# Patient Record
Sex: MALE | Race: BLACK OR AFRICAN AMERICAN | NOT HISPANIC OR LATINO | Employment: OTHER | URBAN - METROPOLITAN AREA
[De-identification: names, ages, dates, MRNs, and addresses within clinical notes are randomized per-mention and may not be internally consistent; named-entity substitution may affect disease eponyms.]

---

## 2023-09-10 PROBLEM — S82.402D CLOSED FRACTURE OF LEFT FIBULA WITH ROUTINE HEALING: Status: ACTIVE | Noted: 2023-09-10

## 2023-09-10 PROBLEM — E11.40 TYPE 2 DIABETES, CONTROLLED, WITH NEUROPATHY (MULTI): Status: ACTIVE | Noted: 2023-09-10

## 2023-09-10 PROBLEM — R26.89 ANTALGIC GAIT: Status: ACTIVE | Noted: 2023-09-10

## 2023-09-10 PROBLEM — M54.12 CERVICAL RADICULOPATHY: Status: ACTIVE | Noted: 2023-09-10

## 2023-09-10 PROBLEM — S82.832A CLOSED FRACTURE OF DISTAL END OF LEFT FIBULA, INITIAL ENCOUNTER: Status: ACTIVE | Noted: 2023-09-10

## 2023-09-10 RX ORDER — AZELASTINE 1 MG/ML
SPRAY, METERED NASAL
COMMUNITY

## 2023-09-10 RX ORDER — HYDROXYZINE HYDROCHLORIDE 25 MG/1
TABLET, FILM COATED ORAL
COMMUNITY

## 2023-09-10 RX ORDER — METOPROLOL SUCCINATE 50 MG/1
TABLET, EXTENDED RELEASE ORAL
COMMUNITY

## 2023-09-10 RX ORDER — ACYCLOVIR 400 MG/1
TABLET ORAL
COMMUNITY

## 2023-09-10 RX ORDER — INSULIN GLARGINE 100 [IU]/ML
INJECTION, SOLUTION SUBCUTANEOUS
COMMUNITY

## 2023-09-10 RX ORDER — AZITHROMYCIN 250 MG/1
TABLET, FILM COATED ORAL
COMMUNITY

## 2023-09-10 RX ORDER — POLYETHYLENE GLYCOL 3350, SODIUM SULFATE ANHYDROUS, SODIUM BICARBONATE, SODIUM CHLORIDE, POTASSIUM CHLORIDE 236; 22.74; 6.74; 5.86; 2.97 G/4L; G/4L; G/4L; G/4L; G/4L
POWDER, FOR SOLUTION ORAL
COMMUNITY

## 2023-09-10 RX ORDER — BLOOD SUGAR DIAGNOSTIC
STRIP MISCELLANEOUS
COMMUNITY

## 2023-09-10 RX ORDER — SPIRONOLACTONE 25 MG/1
TABLET ORAL
COMMUNITY

## 2023-09-10 RX ORDER — TAMSULOSIN HYDROCHLORIDE 0.4 MG/1
CAPSULE ORAL
COMMUNITY

## 2023-09-10 RX ORDER — DULAGLUTIDE 4.5 MG/.5ML
INJECTION, SOLUTION SUBCUTANEOUS
COMMUNITY

## 2023-09-10 RX ORDER — AMMONIUM LACTATE 12 G/100G
CREAM TOPICAL
COMMUNITY

## 2023-09-10 RX ORDER — IBUPROFEN 600 MG/1
TABLET ORAL
COMMUNITY

## 2023-09-10 RX ORDER — OMEPRAZOLE 40 MG/1
CAPSULE, DELAYED RELEASE ORAL
COMMUNITY

## 2023-09-10 RX ORDER — ACETAMINOPHEN AND CODEINE PHOSPHATE 300; 30 MG/1; MG/1
TABLET ORAL
COMMUNITY

## 2023-09-10 RX ORDER — ATORVASTATIN CALCIUM 80 MG/1
TABLET, FILM COATED ORAL
COMMUNITY

## 2023-09-10 RX ORDER — CEPHALEXIN 500 MG/1
CAPSULE ORAL
COMMUNITY

## 2023-09-10 RX ORDER — FLUTICASONE PROPIONATE 50 MCG
SPRAY, SUSPENSION (ML) NASAL
COMMUNITY

## 2023-09-10 RX ORDER — LISINOPRIL 20 MG/1
TABLET ORAL
COMMUNITY

## 2023-09-10 RX ORDER — METFORMIN HYDROCHLORIDE EXTENDED-RELEASE TABLETS 500 MG/1
TABLET, FILM COATED, EXTENDED RELEASE ORAL
COMMUNITY

## 2023-09-10 RX ORDER — NITROGLYCERIN 0.3 MG/1
TABLET SUBLINGUAL
COMMUNITY

## 2023-10-13 ENCOUNTER — APPOINTMENT (OUTPATIENT)
Dept: ORTHOPEDIC SURGERY | Facility: CLINIC | Age: 68
End: 2023-10-13
Payer: COMMERCIAL

## 2023-10-16 ENCOUNTER — OFFICE VISIT (OUTPATIENT)
Dept: ORTHOPEDIC SURGERY | Facility: HOSPITAL | Age: 68
End: 2023-10-16
Payer: COMMERCIAL

## 2023-10-16 ENCOUNTER — HOSPITAL ENCOUNTER (OUTPATIENT)
Dept: RADIOLOGY | Facility: HOSPITAL | Age: 68
Discharge: HOME | End: 2023-10-16
Payer: COMMERCIAL

## 2023-10-16 DIAGNOSIS — S82.402D TRAUMATIC CLOSED NONDISPLACED FRACTURE OF SHAFT OF FIBULA, LEFT, WITH ROUTINE HEALING, SUBSEQUENT ENCOUNTER: ICD-10-CM

## 2023-10-16 DIAGNOSIS — E11.40 DIABETIC NEUROPATHY, PAINFUL (MULTI): Primary | ICD-10-CM

## 2023-10-16 DIAGNOSIS — M76.829 POSTERIOR TIBIAL TENDON DYSFUNCTION: ICD-10-CM

## 2023-10-16 PROCEDURE — 1159F MED LIST DOCD IN RCRD: CPT | Performed by: ORTHOPAEDIC SURGERY

## 2023-10-16 PROCEDURE — 99213 OFFICE O/P EST LOW 20 MIN: CPT | Performed by: ORTHOPAEDIC SURGERY

## 2023-10-16 PROCEDURE — 4010F ACE/ARB THERAPY RXD/TAKEN: CPT | Performed by: ORTHOPAEDIC SURGERY

## 2023-10-16 PROCEDURE — 1160F RVW MEDS BY RX/DR IN RCRD: CPT | Performed by: ORTHOPAEDIC SURGERY

## 2023-10-16 PROCEDURE — 73610 X-RAY EXAM OF ANKLE: CPT | Mod: LT,FY

## 2023-10-16 PROCEDURE — 1125F AMNT PAIN NOTED PAIN PRSNT: CPT | Performed by: ORTHOPAEDIC SURGERY

## 2023-10-16 PROCEDURE — 73610 X-RAY EXAM OF ANKLE: CPT | Mod: LEFT SIDE | Performed by: RADIOLOGY

## 2023-10-16 ASSESSMENT — PAIN SCALES - GENERAL: PAINLEVEL_OUTOF10: 9

## 2023-10-16 ASSESSMENT — PAIN DESCRIPTION - DESCRIPTORS: DESCRIPTORS: DULL

## 2023-10-16 ASSESSMENT — PAIN - FUNCTIONAL ASSESSMENT: PAIN_FUNCTIONAL_ASSESSMENT: 0-10

## 2023-10-16 NOTE — PROGRESS NOTES
This is a pleasant 68 y.o. year old man who presents for fuv of  left ankle  fracture 7/23 distal fibula.  Interventions: Patient reports he is doing well. WBAT in his boot. Doing some weaning at home into tennis shoes. Doing PT and HEP.    PHYSICAL EXAMINATION  Constitutional Exam: patient's height and weight reviewed, well-kempt  Psychiatric Exam: alert and oriented x 3, appropriate mood and behavior  Eye Exam: RUBI, EOMI  Pulmonary Exam: breathing non-labored, no apparent distress  Lymphatic exam: no appreciable lymphadenopathy in the lower extremities  Cardiovascular exam: DP pulses 2+ bilaterally, PT 2+ bilaterally, toes are pink with good capillary refill, no pitting edema  Skin exam: no open lesions, rashes, abrasions or ulcerations  Neurological exam: sensation to light touch decreasded in both lower extremities in peripheral and dermatomal distributions (except for any abnormalities noted in musculoskeletal exam)    Musculoskeletal exam: On examination of the right ankle/foot:  Normal gait  Pes planus alignment  Minimal swelling; No ecchymosis or erythema. Skin intact; no ulcers or lesions.   Normal ROM in  ankle plantarflexion, dorsiflexion, inversion and eversion; normal ROM in 2-5th toe flexion/extension and 1st MTP flexion/extension  Normal strength in ankle plantarflexion, dorsiflexion, inversion and eversion; normal strength with great toe flexion/extension  Tenderness to palpation: none  No tenderness to palpation over the Achilles, medial and lateral malleolus, posterior tibial tendon, peroneal tendon, ATFL, deltoid ligament, talus or navicular.  no tenderness to palpation over heel, plantar arch, base of 1st metatarsal/2nd metatarsal, sesamoids, 5th metatarsal, medial cuneiform, navicular, 1st MTP joint or 2nd or 3rd intermetarsal space.  Neurovascularly intact. Good capillary refill. Sensory deficit to light touch. Normal proprioception. Dorsalis pedis and posterior tibial pulses 2+ bilaterally.   "  - Nixon's test                              - Dorsiflexion-eversion test  - Anterior Drawer test                      - resisted dorsiflexion-eversion test  - Talar tilt test                                    - shuck testing  - Squeeze test  + \"too many toes\" sign       DATA/RESULTS REVIEW: I personally reviewed the patient's x-ray images and reports of the right ankle show a nondisplaced fracture of the distal fibula with callus formation.     ASSESSMENT: right ankle distal fibula fracture; healing  PLAN: Further treatment options discussed.  The patient's questions were answered in detail.  The patient was given a prescription for physical therapy.  Physical therapy is medically necessary to improve strength, balance, range of motion and functional outcomes after injury and/or surgery. The patient was given a prescription for orthotic inserts, which are medically necessary due to the patient's diabetes, neuropathy and pre-ulcerative calluses. They were given a handout on diabetic foot care. Patient is ambulatory and was given a prescription for orthotics, which are medically necessary due to the patient's medical condition and symptomatic pes planus. Patient was given a handout and instructed on an at home stretching program.  They should do these exercises 3 times per day for 6 weeks and then daily. Patient can use OTC Voltaren gel, biofreeze or  aspercream for pain and continue to ice and elevate supported at the calf to reduce swelling. All the patient's questions were answered. The patient agrees with the above plan.  Follow up as needed  "

## 2023-10-16 NOTE — PATIENT INSTRUCTIONS
YOUR BOOT INSTRUCTIONS:  You can put weight on your foot and ankle while in the boot.    You can use crutches or walker for support as needed.   You should wear boot when walking or standing   You can take off your boot while bathing, sitting, stretching, icing or doing therapy exercises.  You can take your boot off at nighttime while sleeping.    BOOT WEANING:  DAY 1-- come out of boot for 1 hour (wear supportive athletic shoes and orthotic inserts), rest of the day in boot  DAY 2-- come out of boot for 2 hours (wear supportive athletic shoes and orthotic inserts), rest of the day in boot  DAY 3-- come out of boot for 3 hours (wear supportive athletic shoes and orthotic inserts), rest of the day in boot  DAY 4-- come out of boot for 4 hours (wear supportive athletic shoes and orthotic inserts), rest of the day in boot  DAY 5-- come out of boot and wear supportive shoes and orthotic inserts  * if you have pain while weaning out of boot then go back one day in the protocol (i.e. If really sore on the morning of Day 3 from coming out of boot for 2 hours the previous day, go back to Day 1 and start again through the protocol)    You can also use OTC Voltaren gel or  aspercreme ointment and apply it to the injured area.      Ice and elevate supported at the calf to reduce swelling    The patient was given a prescription for physical therapy.  Physical therapy is medically necessary to improve strength, balance, range of motion and functional outcomes after injury and/or surgery.    Please call and schedule an appointment to get fitted or molded for your custom made orthotics (see your prescription for phone number).  You need to bring your prescription with you to your appointment.  Insurance coverage for orthotics varies widely and you can contact your insurance company to find out whether orthotics are covered or not with your plan.  The orthotics company also can give you a cost estimate.     Once your custom made  orthotics are made and ready to use, then take out your shoe's insert that it came with and put the orthotic in your shoe.  Orthotics generally work better in lace-up types of shoes (athletic shoes, clarks, keans, merrells, sketchers, etc.).  Sometimes, to accommodate your orthotic, you may have to wear a ½ to one size bigger shoe.  Your orthotics can be adjusted for comfort.  Your pedorthotist can help with adjustments after you wear your orthotics for an initial period of time.    Follow up as needed

## 2023-11-06 ENCOUNTER — EVALUATION (OUTPATIENT)
Dept: PHYSICAL THERAPY | Facility: HOSPITAL | Age: 68
End: 2023-11-06
Payer: COMMERCIAL

## 2023-11-06 DIAGNOSIS — M76.829 POSTERIOR TIBIAL TENDON DYSFUNCTION: ICD-10-CM

## 2023-11-06 DIAGNOSIS — S82.402D TRAUMATIC CLOSED NONDISPLACED FRACTURE OF SHAFT OF FIBULA, LEFT, WITH ROUTINE HEALING, SUBSEQUENT ENCOUNTER: ICD-10-CM

## 2023-11-06 DIAGNOSIS — E11.40 DIABETIC NEUROPATHY, PAINFUL (MULTI): ICD-10-CM

## 2023-11-06 PROCEDURE — 97110 THERAPEUTIC EXERCISES: CPT | Mod: GP

## 2023-11-06 PROCEDURE — 97164 PT RE-EVAL EST PLAN CARE: CPT | Mod: GP

## 2023-11-06 ASSESSMENT — ENCOUNTER SYMPTOMS
LOSS OF SENSATION IN FEET: 1
DEPRESSION: 0
OCCASIONAL FEELINGS OF UNSTEADINESS: 1

## 2023-11-06 NOTE — PROGRESS NOTES
Physical Therapy Initial Evaluation    Patient Name:Sergio Mead  MRN:[id number]  Today's Date:11/6/2023  Referred by: Mary Reaves  Time Calculation  Start Time: 1345  Stop Time: 1440  Time Calculation (min): 55 min    Therapy Diagnosis  1. Traumatic closed nondisplaced fracture of shaft of fibula, left, with routine healing, subsequent encounter    2. Diabetic neuropathy, painful (CMS/HCC)    3. Posterior tibial tendon dysfunction         Plan of Care  Goals: Goals set and discussed today. By discharge:  Balance: Patient will be able to balance on left LE x10 sec with min UE assist. - progressing  Gait/Locomotion: Patient will ambulate with min gait deviations without use of AD.,- progressing  Pain: Patient will report a high pain level less than or equal to 3/10 with ADLs.,- progressing  Strength: Patient will increase left ankle strength to greater than or equal to 4/5 PF, INV, and EV - progressing  Patient will score greater than or equal to 40/80 on LEFS to demonstrate improved function - progressing  Patient will properly demonstrate HEP to improve LE ROM, strength, and function. - progressing    Planned interventions include: prn. Cryotherapy/moist hot pack prn, edema control, electrical stimulation prn, gait training, home exercise program, Kinesiotaping PRN, manual therapy, therapeutic exercise, vasopneumatic device with cold    Frequency and duration: 1-2 time(s) a week, for 6 visits .    Potential to achieve rehab goals is good      Plan of care was developed with input and agreement by the patient.   Patient's Goal for Treatment:  relieving pain , increasing strength , increasing mobility, reducing symptoms.     Assessment    Patient is a  68 y.o. male who presents with complaint of left foot/ankle pain . Standardized testing and measures  administered today reveal that the patient has multiple impairments in body structures and functions, activity limitations, and participation restrictions. These include subjective and objective findings such as pain, tenderness to palpation of the effected area, decreased ROM, strength, flexibility, and function. The patient's impairments are likely influenced by mechanical dysfunction and deconditioning with possible degenerative changes. Skilled PT services are warranted in order to realize measurable and meaningful change in the above outcome measures and achieve improvements in the patient’s functional status and individual goals.  Rehab Potential:good  Clinical Presentation: Stable and/or uncomplicated characteristics.   Level of Complexity: low  Problem List:  Decreased balance, decreased functional level, decreased knowledge of HEP, edema, decreased flexibility, gait deviations, pain, decreased range of motion/joint mobility and strength.      Adult Risk Screening  There are no spiritual/cultural practices/values/needs that are important to know   Initial Fall Risk Screening: Sergio has not fallen in the last 6 months. He does have a fear of falling. He does not need assistance with sitting, standing or walking. He does not need assistance walking in her home. He does not need assistance in an unfamiliar setting. He is using an assistive device.   The patient does not need extra help because of problems with hearing, speaking, seeing, moving around or learning. The patient is comfortable filling out medical forms.   Key Learner(s) are identified by the patient as person(s) most likely to participate in providing care, such as managing medications or taking them to doctors’ appointments. Name of Key Learner: self.   Primary Language for learning: English     Insurance  Visit number: 1   Visit 1 (7 of 12)  Approved number of visits: 12  Onset Date: 2/10/2023  Medicare Certification Period:  Beginning:      2023            Endin24    Subjective  Mechanism of Injury:  due to a fall - slipped and fell at [facility]  He has been feeling more discomfort lately.  He ended up not going to [place].    Location of Pain: left ankle  Current Pain Level (0-10): 6   High Pain Level (0-10): 10   Low Pain Level (0-10): 0  Pain Quality: throbbing,   Pain Exacerbating Factors: sitting after standing/walking  Pain Relieving Factors: medications pain meds and rest.     Medical Screening: Reviewed medical history form with patient and medical screening assessed.   Current Medical Management: x-ray at MD visit showed healing, getting custom orthotic on   Precautions/Fall Risk:weight bearing status: as tolerated  Prior level of function: Active  Functional limitations: standing, driving,  walking , participation in leisure activities, stairs    Work Status: retired . 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   Living Environment: apartment  Social Support: spouse / significant other  Personal Factors That May Impact Care: drug abuse , emotional , financial , obesity , transportation .     Objective  Initial // / // Current    L ankle PF AROM (degrees): 10 // 50 /  L ankle DF AROM (degrees): neutral //  /10   L ankle INV AROM (degrees): 10  20 //  10  L ankle EV AROM (degrees): 5 // 10 // 5  L ankle PF strength (MMT): 4-/5 // 4/5 //  4-/5  L ankle DF strength (MMT): 4+/5 // 5/5 // 5/5   L ankle INV strength (MMT): 3/5 // 4-/5 //  4-/5  L ankle EV strength (MMT): 4-/5 // 4+/5 // 4-/5  Palpation: tender to palpation lateral malleoli and top of foot // some tenderness to palpation lateral malleoli and foot//mild tenderness to palpation medial and lateral malleoli and arch of foot, lateral calf/Achilles  Gait: boot left foot, crutches // patient able to  ambulate short distances with shoe on both feet//uses boot and crutch about 1-2 hours a day, at home boot and tennis shoe // ambulates with antalgic gait with tennis shoe  LEFS: 7/80 // 20/80 // 24/80    Treatment Performed Today: re-evaluation and patient education regarding diagnosis, prognosis, contributing factors, comorbidities, importance of HEP, role of PT, and appropriate shoe wear    Response to Treatment: improved knowledge and understanding of condition    Resources provided today: Home Program   Sonia: see scanned

## 2023-11-06 NOTE — Clinical Note
November 6, 2023     Patient: Sergio Mead   YOB: 1955   Date of Visit: 11/6/2023       To Whom it May Concern:    Sergio Mead was seen in my clinic on 11/6/2023. He {Return to school/sport:27094}.    If you have any questions or concerns, please don't hesitate to call.         Sincerely,          Shakila Titus, PT        CC: No Recipients

## 2023-11-06 NOTE — Clinical Note
November 6, 2023     Patient: Sergio Mead   YOB: 1955   Date of Visit: 11/6/2023       To Whom It May Concern:    It is my medical opinion that Sergio Mead {Work release (duty restriction):50223}.    If you have any questions or concerns, please don't hesitate to call.         Sincerely,        Shakila Titus, PT    CC: No Recipients

## 2023-11-09 ENCOUNTER — TREATMENT (OUTPATIENT)
Dept: PHYSICAL THERAPY | Facility: HOSPITAL | Age: 68
End: 2023-11-09
Payer: COMMERCIAL

## 2023-11-09 DIAGNOSIS — S82.402D TRAUMATIC CLOSED NONDISPLACED FRACTURE OF SHAFT OF FIBULA, LEFT, WITH ROUTINE HEALING, SUBSEQUENT ENCOUNTER: Primary | ICD-10-CM

## 2023-11-09 DIAGNOSIS — R26.89 ANTALGIC GAIT: ICD-10-CM

## 2023-11-09 PROCEDURE — 97110 THERAPEUTIC EXERCISES: CPT | Mod: GP

## 2023-11-09 PROCEDURE — 97016 VASOPNEUMATIC DEVICE THERAPY: CPT | Mod: GP

## 2023-11-09 NOTE — PROGRESS NOTES
Physical Therapy Treatment    Patient Name:Sergio Mead  MRN:[id number]  Today's Date:2023  Referred by: Dr Reaves  Time Calculation  Start Time: 1145  Stop Time: 1245  Time Calculation (min): 60 min    Therapy Diagnosis  1. Traumatic closed nondisplaced fracture of shaft of fibula, left, with routine healing, subsequent encounter    2. Antalgic gait       Assessment: Pain limited progression of exercises.      Plan: Cont gradual progression of stretching and strengthening     Insurance  Visit number: 2 (8)  Approved number of visits: 12   Onset Date: 2/10/2023  Medicare Certification Period:  Beginnin/6/2023            Endin24    Precautions/Fall Risk:weight bearing status: as tolerated     Subjective/Pain:  Current Pain Level (0-10): 6/10  Patient reports that he didn't sleep well last night.  He is trying to get weed since it has been legalized in [place] now.    HEP Compliance: Poor    Objective/Outcome Measures:    Treatment  Recumbent stepper bike level x6 minutes  Slant board calf stretch x1 minute  Step hamstring stretch x1 30 seconds  Standing at bar heel / toe raise x10 reps  TRX mini squats x5 reps  Seated BAPS level 2 x20 reps PF/DF  Seated BAPS level 2 x10 reps INV/EV  Seated BAPS level 2 5 reps CW/CCW rotation  Seated ankle PF/DF x10 reps  Seated ankle alphabet A-Z  Supine calf stretch w blue stretching strap x1 minute  Supine left SLR x10 reps  Long sitting RTB left ankle PF 2x10 reps  Long sitting RTB left ankle eversion 2x10 reps  Supine left ankle INV 2x10 reps (unable to do against resistance)  Supine left ankle DF 2x10 RTB   Supine left ankle PNF x10 reps  Supine LAQ 5 sec hold x15 reps    Game Ready applied to left ankle x10 minutes, min pressure, to decrease soreness/edema        Goals:  Active       Left ankle       Pain: Patient will report a high pain level less than or equal to 3/10 with ADLs., (Progressing)       Start:  23    Expected End:  24       Baseline:  3/10 pain         Balance: Patient will be able to balance on left LE x10 sec with min UE assist.  (Progressing)       Start:  09/30/23    Expected End:  02/04/24            Gait/Locomotion: Patient will ambulate with min gait deviations without use of AD. (Progressing)       Start:  09/30/23    Expected End:  02/04/24       Baseline: Gait:boot left foot, crutches         Range Of Motion/Joint Mobility: Patient will increase left ankle AROM to greater than or equal to 30 deg PF and 5 deg DF., (Progressing)       Start:  09/30/23    Expected End:  02/04/24       Baseline:   L ankle PF AROM (degrees): 10  L ankle DF AROM (degrees): neutral         Strength: Patient will increase left ankle strength to greater than or equal to 4/5 PF, INV, and EV (Progressing)       Start:  09/30/23    Expected End:  02/04/24       Baseline:   L ankle PF strength (MMT): 4-/5  L ankle INV strength (MMT): 3/5  L ankle EV strength (MMT): 3-/5           Patient will score greater than or equal to 40/80 on LEFS to demonstrate improved function. (Progressing)       Start:  09/30/23    Expected End:  02/04/24            Patient will properly demonstrate HEP to improve LE ROM, strength, and function., (Progressing)       Start:  09/30/23    Expected End:  02/04/24       Baseline: decreased knowledge of HEP

## 2023-11-13 ENCOUNTER — DOCUMENTATION (OUTPATIENT)
Dept: PHYSICAL THERAPY | Facility: HOSPITAL | Age: 68
End: 2023-11-13
Payer: COMMERCIAL

## 2023-11-13 NOTE — PROGRESS NOTES
Physical Therapy                 Therapy Communication Note    Patient Name: Sergio Mead  MRN: [id number]  Today's Date: 11/13/2023     Discipline: Physical Therapy    Missed Time: No Show

## 2023-11-16 ENCOUNTER — APPOINTMENT (OUTPATIENT)
Dept: PHYSICAL THERAPY | Facility: HOSPITAL | Age: 68
End: 2023-11-16
Payer: COMMERCIAL

## 2023-11-20 ENCOUNTER — TREATMENT (OUTPATIENT)
Dept: PHYSICAL THERAPY | Facility: HOSPITAL | Age: 68
End: 2023-11-20
Payer: COMMERCIAL

## 2023-11-20 DIAGNOSIS — R26.89 ANTALGIC GAIT: ICD-10-CM

## 2023-11-20 DIAGNOSIS — S82.402D TRAUMATIC CLOSED NONDISPLACED FRACTURE OF SHAFT OF FIBULA, LEFT, WITH ROUTINE HEALING, SUBSEQUENT ENCOUNTER: ICD-10-CM

## 2023-11-20 PROCEDURE — 97110 THERAPEUTIC EXERCISES: CPT | Mod: GP

## 2023-11-20 PROCEDURE — 97016 VASOPNEUMATIC DEVICE THERAPY: CPT | Mod: GP

## 2023-11-20 NOTE — PROGRESS NOTES
Physical Therapy Treatment    Patient Name:Sergio Mead  MRN:[id number]  Today's Date:2023  Referred by: Mary Reaves  Time Calculation  Start Time: 1308  Stop Time: 1415  Time Calculation (min): 67 min    Therapy Diagnosis  1. Traumatic closed nondisplaced fracture of shaft of fibula, left, with routine healing, subsequent encounter    2. Antalgic gait         Assessment: Patient able to progress standing exercises today.  He demonstrates improved ankle AROM and increased confidence with weight bearing exercises.    Plan: Cont progression of strengthening and stretching, weight bearing exercises. PN at 10th visit to ask for additional visits. He is getting his custom orthotics on 12/3.    Insurance  Visit number: 3 (9)  Approved number of visits: 12  Onset Date: 2/10/2023  Medicare Certification Period:  Beginnin/6/2023            Endin24    Precautions/Fall Risk: weight bearing status: as tolerated , fall risk low    Subjective/Pain:  Current Pain Level (0-10): 0 Patient repots that he has been wearing his shoe/no shoe or boot in the apartment.  He is not wearing the boot at home.  If he is going on a longer walk outside of the home he'll wear it.      HEP Compliance: Fair    Objective/Outcome Measures:  Patient ambulates with mild antalgic gait with bilateral high top tennis shoes.    Treatment  Therapeutic Exercise 50 minutes  Recumbent stepper bike level x6 minutes  Slant board calf stretch x1 minute  Step hamstring stretch x1 30 seconds  Standing at bar heel / toe raise x20 reps  TRX mini squats x10 reps  Standing BAPS level 2 x20 reps PF/DF  Standing BAPS level 2 x10 reps INV/EV  Standing BAPS level 2 x5 reps CW/CCW rotation  Standing march on blue foam march x15 reps w UE assist  Square rocker board fwd back rock x15 reps  Round balance board balance with 1 UE assist x30 sec  Shuttle leg press level 6 stop 7 w 0 cords 2x10 reps  Seated ankle alphabet A-Z  Supine calf stretch w blue  stretching strap x1 minute  Long sitting RTB left ankle PF 3x10 reps  Long sitting RTB left ankle eversion 2x10 reps  Long sitting RTB left ankle DF 3x10 reps  Supine left ankle RTB INV 2x10 reps  Supine bridge 2x10 reps  Supine left ankle PNF 2x10 reps    Game Ready applied to left ankle x10 min after treatment to decrease soreness/edema, patient supine    Goals:  Active       Left ankle       Pain: Patient will report a high pain level less than or equal to 3/10 with ADLs., (Progressing)       Start:  09/30/23    Expected End:  02/04/24       Baseline: 3/10 pain         Balance: Patient will be able to balance on left LE x10 sec with min UE assist.  (Progressing)       Start:  09/30/23    Expected End:  02/04/24            Gait/Locomotion: Patient will ambulate with min gait deviations without use of AD. (Progressing)       Start:  09/30/23    Expected End:  02/04/24       Baseline: Gait:boot left foot, crutches         Range Of Motion/Joint Mobility: Patient will increase left ankle AROM to greater than or equal to 30 deg PF and 5 deg DF., (Progressing)       Start:  09/30/23    Expected End:  02/04/24       Baseline:   L ankle PF AROM (degrees): 10  L ankle DF AROM (degrees): neutral         Strength: Patient will increase left ankle strength to greater than or equal to 4/5 PF, INV, and EV (Progressing)       Start:  09/30/23    Expected End:  02/04/24       Baseline:   L ankle PF strength (MMT): 4-/5  L ankle INV strength (MMT): 3/5  L ankle EV strength (MMT): 3-/5           Patient will score greater than or equal to 40/80 on LEFS to demonstrate improved function. (Progressing)       Start:  09/30/23    Expected End:  02/04/24            Patient will properly demonstrate HEP to improve LE ROM, strength, and function., (Progressing)       Start:  09/30/23    Expected End:  02/04/24       Baseline: decreased knowledge of HEP

## 2023-11-28 ENCOUNTER — APPOINTMENT (OUTPATIENT)
Dept: PHYSICAL THERAPY | Facility: HOSPITAL | Age: 68
End: 2023-11-28
Payer: COMMERCIAL

## 2023-11-30 ENCOUNTER — APPOINTMENT (OUTPATIENT)
Dept: PHYSICAL THERAPY | Facility: HOSPITAL | Age: 68
End: 2023-11-30
Payer: COMMERCIAL

## 2023-12-04 ENCOUNTER — TREATMENT (OUTPATIENT)
Dept: PHYSICAL THERAPY | Facility: HOSPITAL | Age: 68
End: 2023-12-04
Payer: COMMERCIAL

## 2023-12-04 DIAGNOSIS — R26.89 ANTALGIC GAIT: ICD-10-CM

## 2023-12-04 DIAGNOSIS — S82.402D TRAUMATIC CLOSED NONDISPLACED FRACTURE OF SHAFT OF FIBULA, LEFT, WITH ROUTINE HEALING, SUBSEQUENT ENCOUNTER: Primary | ICD-10-CM

## 2023-12-04 PROCEDURE — 97016 VASOPNEUMATIC DEVICE THERAPY: CPT | Mod: GP

## 2023-12-04 PROCEDURE — 97110 THERAPEUTIC EXERCISES: CPT | Mod: GP

## 2023-12-04 NOTE — PROGRESS NOTES
Physical Therapy Treatment    Patient Name:Sergio Mead  MRN:[id number]  Today's Date:2023  Referred by: Dr Reaves  Time Calculation  Start Time: 1245  Stop Time: 1350  Time Calculation (min): 65 min    Therapy Diagnosis  1. Traumatic closed nondisplaced fracture of shaft of fibula, left, with routine healing, subsequent encounter    2. Antalgic gait         Assessment: Patient able to progress standing exercises and wear shoes for appointment today.  Verbal and visual cues required for proper form with there ex.    Plan:  Cont progression of strengthening and stretching, weight bearing exercises. PN next visit to ask for additional visits.     Insurance  Visit number: 4 (10)  Approved number of visits: 12  Onset Date: 2/10/2023  Medicare Certification Period:  Beginnin/6/2023            Endin24    Precautions/Fall Risk: weight bearing status: as tolerated , fall risk low     Subjective/Pain:  Current Pain Level (0-10): 0 Patient received custom orthotics on Friday.  He started today to walk without boot, just using a single crutch.  His right leg is bothering him without the boot.  He fell last week due to the snow and was sore after.  His landed on his right arm and hip.  Going to [place] and possibly [place] late May.    HEP Compliance: Fair    Objective/Outcome Measures: Patient ambulates with shoes on bilateral feet and custom orthotics in both shoes - min antalgic gait    Treatment  Therapeutic Exercise 50 minutes  Recumbent stepper bike level x10 minutes  Slant board calf stretch x1 minute  Step hamstring stretch x1 minute  Standing at bar heel / toe raise x20 reps  TRX mini squats x20 reps  Standing BAPS level 3 x20 reps PF/DF  Standing BAPS level 3 x10 reps INV/EV  Standing march on blue foam march x1 minute  Square rocker board fwd back rock x20 reps  Round balance board balance without UE assist x30 sec   Shuttle leg press level 6 stop 7 w 1 blue and 1 yellow cords  x10 reps, 0 cords x10 reps  Supine calf stretch w blue stretching strap x1 minute  Long sitting RTB left ankle PF 3x10 reps  Long sitting RTB left ankle eversion 2x10 reps  Long sitting RTB left ankle DF 3x10 reps  Supine left ankle RTB INV 2x10 reps    Gait training: reviewed ambulation with standard cane in right hand, sequencing x3 minutes    Modalities   Vasopneumatic Device     10 10minutes Game Ready applied to left ankle to decrease soreness and edema    Goals:  Active         Left ankle         Pain: Patient will report a high pain level less than or equal to 3/10 with ADLs., (Progressing)         Start:  09/30/23    Expected End:  02/04/24        Baseline: 3/10 pain           Balance: Patient will be able to balance on left LE x10 sec with min UE assist.  (Progressing)         Start:  09/30/23    Expected End:  02/04/24              Gait/Locomotion: Patient will ambulate with min gait deviations without use of AD. (Progressing)         Start:  09/30/23    Expected End:  02/04/24        Baseline: Gait:boot left foot, crutches           Range Of Motion/Joint Mobility: Patient will increase left ankle AROM to greater than or equal to 30 deg PF and 5 deg DF., (Progressing)         Start:  09/30/23    Expected End:  02/04/24        Baseline:   L ankle PF AROM (degrees): 10  L ankle DF AROM (degrees): neutral           Strength: Patient will increase left ankle strength to greater than or equal to 4/5 PF, INV, and EV (Progressing)         Start:  09/30/23    Expected End:  02/04/24        Baseline:   L ankle PF strength (MMT): 4-/5  L ankle INV strength (MMT): 3/5  L ankle EV strength (MMT): 3-/5              Patient will score greater than or equal to 40/80 on LEFS to demonstrate improved function. (Progressing)         Start:  09/30/23    Expected End:  02/04/24              Patient will properly demonstrate HEP to improve LE ROM, strength, and function., (Progressing)         Start:  09/30/23    Expected End:   02/04/24        Baseline: decreased knowledge of HEP

## 2023-12-12 ENCOUNTER — TREATMENT (OUTPATIENT)
Dept: PHYSICAL THERAPY | Facility: HOSPITAL | Age: 68
End: 2023-12-12
Payer: COMMERCIAL

## 2023-12-12 DIAGNOSIS — R26.89 ANTALGIC GAIT: ICD-10-CM

## 2023-12-12 DIAGNOSIS — S82.402D TRAUMATIC CLOSED NONDISPLACED FRACTURE OF SHAFT OF FIBULA, LEFT, WITH ROUTINE HEALING, SUBSEQUENT ENCOUNTER: Primary | ICD-10-CM

## 2023-12-12 PROCEDURE — 97110 THERAPEUTIC EXERCISES: CPT | Mod: GP

## 2023-12-12 PROCEDURE — 97016 VASOPNEUMATIC DEVICE THERAPY: CPT | Mod: GP

## 2023-12-12 NOTE — PROGRESS NOTES
"Physical Therapy Treatment    Patient Name:Sergio Mead  MRN:[id number]  Today's Date:2023  Referred by: Dr Reaves  Time Calculation  Start Time: 1100  Stop Time: 1159  Time Calculation (min): 59 min    Therapy Diagnosis  1. Traumatic closed nondisplaced fracture of shaft of fibula, left, with routine healing, subsequent encounter    2. Antalgic gait         Assessment: decreased intensity of exercises today- patient somewhat fatigued but requested to continue exercising    Plan: progress note next visit   Consider resuming:  Standing march on blue foam march x1 minute  Square rocker board fwd back rock x20 reps  Round balance board balance without UE assist x30 sec   Shuttle leg press level 6 stop 7 w 1 blue and 1 yellow cords x10 reps, 0 cords x10 reps  Supine calf stretch w blue stretching strap x1 minute    Insurance  Visit number: 5 (11)  Approved number of visits: 12  Onset Date: 2/10/2023  Medicare Certification Period:  Beginnin/6/2023            Endin24    Precautions/Fall Risk: weight bearing status: as tolerated , fall risk low      Subjective/Pain:  Current Pain Level (0-10): 7 The pain in up the outside of his leg from his ankle to senior living up the calf.  Patient reports that he found out that he has \"severe blockage in the aorta\".  They thought he had lung cancer and that was negative. He is scheduled to see cardiology Friday.      HEP Compliance: Fair    Objective/Outcome Measures:    Treatment  Therapeutic Exercise 40 minutes  Recumbent stepper bike level x10 minutes  Slant board calf stretch x1 minute  Step left hamstring stretch x1 minute  Standing at bar heel / toe raise x20 reps  TRX mini squats 2x10 reps  Standing BAPS level 3 x20 reps PF/DF  Standing BAPS level 3 x10 reps INV/EV  Seated RTB left ankle PF 3x10 reps  Seated RTB left ankle DF 3x10 reps  Seated RTB left ankle eversion 3x10 reps  Seated RTB left ankle inversion 3x10 reps    Modalities   Vasopneumatic Device     10 " minutes    Goals:

## 2023-12-15 ENCOUNTER — TREATMENT (OUTPATIENT)
Dept: PHYSICAL THERAPY | Facility: HOSPITAL | Age: 68
End: 2023-12-15
Payer: COMMERCIAL

## 2023-12-15 DIAGNOSIS — R26.89 ANTALGIC GAIT: ICD-10-CM

## 2023-12-15 DIAGNOSIS — S82.402D TRAUMATIC CLOSED NONDISPLACED FRACTURE OF SHAFT OF FIBULA, LEFT, WITH ROUTINE HEALING, SUBSEQUENT ENCOUNTER: ICD-10-CM

## 2023-12-15 PROCEDURE — 97110 THERAPEUTIC EXERCISES: CPT | Mod: GP

## 2023-12-15 PROCEDURE — 97016 VASOPNEUMATIC DEVICE THERAPY: CPT | Mod: GP

## 2023-12-15 NOTE — PROGRESS NOTES
Physical Therapy Treatment    Patient Name:Sergio Mead  MRN:[id number]  Today's Date:12/15/2023  Referred by: Mary Reaves  Time Calculation  Start Time: 1330  Stop Time: 1430  Time Calculation (min): 60 min    Therapy Diagnosis  1. Traumatic closed nondisplaced fracture of shaft of fibula, left, with routine healing, subsequent encounter    2. Antalgic gait         Assessment: Patient is gradually progressing in therapy.  He demonstrates improved ROM,strength, and function.  He continues to c/o pain, though, throughout the day.        Plan of Care:  Goals: Goals set and discussed today. By discharge:  Balance: Patient will be able to balance on left LE x10 sec with min UE assist. - progressing  Gait/Locomotion: Patient will ambulate with min gait deviations without use of AD.,- progressing  Pain: Patient will report a high pain level less than or equal to 3/10 with ADLs.,- progressing  Strength: Patient will increase left ankle strength to greater than or equal to 4/5 PF, INV, and EV - progressing  Patient will score greater than or equal to 40/80 on LEFS to demonstrate improved function - progressing  Patient will properly demonstrate HEP to improve LE ROM, strength, and function. - progressing     Planned interventions include: prn. Cryotherapy/moist hot pack prn, edema control, electrical stimulation prn, gait training, home exercise program, Kinesiotaping PRN, manual therapy, therapeutic exercise, vasopneumatic device with cold     Frequency and duration: 1-2 time(s) a week, for 10 additional visits .  Will ask insurance for additional visits.  Potential to achieve rehab goals is good       Plan of care was developed with input and agreement by the patient.   Patient's Goal for Treatment:  relieving pain, increasing strength, increasing mobility, reducing symptoms.     Insurance  Visit number: 6 (12)  Approved number of visits: 12  Onset Date: 2/10/2023  Medicare Certification Period:  Beginning:      2023            Endin24     Precautions/Fall Risk: weight bearing status: as tolerated , fall risk low      Subjective/Pain:  Current Pain Level (0-10): 4 The lateral shin hurts pretty much all of the time.  Able to do things but notice it.  He is seeing cardiology on the .      HEP Compliance: Fair    Objective  Initial /23 // 23 // 12/15/23     L ankle PF AROM (degrees): 10 // 50 /  L ankle DF AROM (degrees): neutral //  //10 // 10  L ankle INV AROM (degrees): 10 // 20 //  10 /  L ankle EV AROM (degrees): 5  10 //  /  L ankle PF strength (MMT): 4-/5 // 4/5 //  4-/5 // 4/5  L ankle DF strength (MMT): 4+/5 // 5/5 // 5/5  // 4+/5  L ankle INV strength (MMT): 3/5 // 4-/5 //  4-/5 // 4/5  L ankle EV strength (MMT): 4-/5 // 4+/5 // 4-/5 // 4/5  Palpation: tender to palpation lateral malleoli and top of foot // some tenderness to palpation lateral malleoli and foot//mild tenderness to palpation medial and lateral malleoli and arch of foot, lateral calf/Achilles //   Gait: boot left foot, crutches // patient able to ambulate short distances with shoe on both feet//uses boot and crutch about 1-2 hours a day, at home boot and tennis shoe // ambulates with antalgic gait with tennis shoe // patient able to ambulate with tennis shoes on bilateral feet,occasional   LEFS:  / //   /    Treatment  Therapeutic Exercise 45 minutes  Recumbent stepper bike level x10 minutes  Slant board calf stretch x1 minute  Step left hamstring stretch x1 minute  Standing at bar heel / toe raise x20 reps  TRX mini squats 2x10 reps  Standing fwd mini lunge onto BOSU alt LE  Fwd step down 4 in leading with right LE 2x10 reps  4 in lateral step up x10 reps bilateral LEs  Standing BAPS level 3 x20 reps PF/DF  Standing BAPS level 3 x10 reps INV/EV  Side step length of bar on wall down and back  Back step length of bar on wall down and back  Round balance board balance x1 min with 1 UE  assist  Blue foam march at bar on wall 2x10 reps bilateral  Measurements taken for progress note  Progression of HEP - see scanned copy of exercises  Modalities   Vasopneumatic Device     10 minutes

## 2023-12-18 ENCOUNTER — TREATMENT (OUTPATIENT)
Dept: PHYSICAL THERAPY | Facility: HOSPITAL | Age: 68
End: 2023-12-18
Payer: COMMERCIAL

## 2023-12-18 DIAGNOSIS — R26.89 ANTALGIC GAIT: ICD-10-CM

## 2023-12-18 DIAGNOSIS — S82.402D TRAUMATIC CLOSED NONDISPLACED FRACTURE OF SHAFT OF FIBULA, LEFT, WITH ROUTINE HEALING, SUBSEQUENT ENCOUNTER: ICD-10-CM

## 2023-12-18 PROCEDURE — 97110 THERAPEUTIC EXERCISES: CPT | Mod: GP

## 2023-12-18 PROCEDURE — 97016 VASOPNEUMATIC DEVICE THERAPY: CPT | Mod: GP

## 2023-12-18 NOTE — PROGRESS NOTES
Physical Therapy Treatment    Patient Name:Sergio Mead  MRN:[id number]  Today's Date:2023  Referred by: Mary Reaves  Time Calculation  Start Time: 1345  Stop Time: 1446  Time Calculation (min): 61 min    Therapy Diagnosis  1. Traumatic closed nondisplaced fracture of shaft of fibula, left, with routine healing, subsequent encounter    2. Antalgic gait         Assessment: Patient able to progress exercises today without c/o increased symptoms.  Verbal and visual cues required for proper from with there ex.    Plan: Awaiting insurance approval for additional visits after next visit.    Insurance  Visit number: 7 (13)  Onset Date: 2/10/2023  Medicare Certification Period:  Beginnin/6/2023            Endin24    Precautions/Fall Risk: weight bearing status: as tolerated , fall risk low       Subjective/Pain:  Current Pain Level (0-10): 2 The ankle still bothers me but it is more of an irritation than a pain.  Getting better.    HEP Compliance: Good    Objective/Outcome Measures: Patient ambulates with mildly antalgic gait.      Treatment  Therapeutic Exercise 45 minutes  Recumbent bike level 1 x5 minutes  Slant board calf stretch x1 minute  Step left hamstring stretch x1 minute  4 inch step down with right foot / left foot stays on step 2x10 reps  4 inch lateral step down with left foot on step 2x10 reps  FWD mini lunge onto BOSU x10 reps bilateral  Blue foam march with bilateral UE assist x30 seconds  Square rocker board rock fwd/back x20 reps  Fwd lunge with slider x10 reps with UEs holding  Sitting on jump  level 6 stop 8 with 1 yellow cord 2x10 reps leg press  Fwd step over 5 - 6 in hurdles down and back   Lat step over 5-6 in hurdles down and back    Modalities   Vasopneumatic Device     10 minutes

## 2023-12-22 ENCOUNTER — APPOINTMENT (OUTPATIENT)
Dept: PHYSICAL THERAPY | Facility: HOSPITAL | Age: 68
End: 2023-12-22
Payer: COMMERCIAL

## 2024-01-02 ENCOUNTER — APPOINTMENT (OUTPATIENT)
Dept: PHYSICAL THERAPY | Facility: HOSPITAL | Age: 69
End: 2024-01-02
Payer: COMMERCIAL

## 2024-01-10 ENCOUNTER — APPOINTMENT (OUTPATIENT)
Dept: PHYSICAL THERAPY | Facility: HOSPITAL | Age: 69
End: 2024-01-10
Payer: COMMERCIAL

## 2024-01-12 ENCOUNTER — TREATMENT (OUTPATIENT)
Dept: PHYSICAL THERAPY | Facility: HOSPITAL | Age: 69
End: 2024-01-12
Payer: COMMERCIAL

## 2024-01-12 DIAGNOSIS — S82.402D TRAUMATIC CLOSED NONDISPLACED FRACTURE OF SHAFT OF FIBULA, LEFT, WITH ROUTINE HEALING, SUBSEQUENT ENCOUNTER: Primary | ICD-10-CM

## 2024-01-12 DIAGNOSIS — R26.89 ANTALGIC GAIT: ICD-10-CM

## 2024-01-12 PROCEDURE — 97016 VASOPNEUMATIC DEVICE THERAPY: CPT | Mod: GP

## 2024-01-12 PROCEDURE — 97110 THERAPEUTIC EXERCISES: CPT | Mod: GP

## 2024-01-12 NOTE — PROGRESS NOTES
Physical Therapy Treatment    Patient Name:Sergio Mead  MRN:[id number]  Today's Date:2024  Referred by: Mary Reaves  Time Calculation  Start Time: 1030  Stop Time: 1120  Time Calculation (min): 50 min    Therapy Diagnosis  1. Traumatic closed nondisplaced fracture of shaft of fibula, left, with routine healing, subsequent encounter    2. Antalgic gait         Assessment: Patient ambulates with less antalgic gait.      Plan: Cont gradual progression of balance/strengthening exercises - consider adding leg press and sit to stand from elevated platform- auth required after next visit - PN    Insurance  Visit number: 8 (14)  Onset Date: 2/10/2023  Medicare Certification Period:  Beginnin/6/2023            Endin24     Precautions/Fall Risk: weight bearing status: as tolerated , fall risk low        Subjective/Pain:  Current Pain Level (0-10): 4-5 Patient reports that his ankle has been sore. When walking around he is feeling his arches being more flat.  He saw a podiatrist and had x-rays.  He had Covid so he cancelled last couple appointments.  Still recovering and wants to take it easy today.  Recent heart testing seems ok.     HEP Compliance: Fair    Objective/Outcome Measures: Patient ambulates with minimally antalgic gait      Treatment  Therapeutic Exercise 40 minutes  Recumbent bike level 1 x12 minutes  Slant board calf stretch x1 minute  Step left hamstring stretch x1 minute  4 inch step down with right foot / left foot stays on step x20 reps  4 inch lateral step down with left foot on step x20 reps  FWD mini lunge onto BOSU x10 reps bilateral  Square rocker board rock fwd/back x20 reps  Standing BAPS level 3 PF/DF x20 reps, INV/EV x20 reps  Fwd lunge with slider x10 reps with UEs holding    Modalities   Vasopneumatic Device     10 minutes

## 2024-01-18 ENCOUNTER — TREATMENT (OUTPATIENT)
Dept: PHYSICAL THERAPY | Facility: HOSPITAL | Age: 69
End: 2024-01-18
Payer: COMMERCIAL

## 2024-01-18 DIAGNOSIS — R26.89 ANTALGIC GAIT: ICD-10-CM

## 2024-01-18 DIAGNOSIS — S82.402D TRAUMATIC CLOSED NONDISPLACED FRACTURE OF SHAFT OF FIBULA, LEFT, WITH ROUTINE HEALING, SUBSEQUENT ENCOUNTER: ICD-10-CM

## 2024-01-18 PROCEDURE — 97016 VASOPNEUMATIC DEVICE THERAPY: CPT | Mod: GP

## 2024-01-18 PROCEDURE — 97110 THERAPEUTIC EXERCISES: CPT | Mod: GP

## 2024-01-18 NOTE — LETTER
January 18, 2024    Shakila Titus PT  [address]  [facility]  [facility] [address]    Patient: Sergio Mead   YOB: 1955   Date of Visit: 1/18/2024       Dear Mary Reaves Md  [address]  [place] [address]    The attached plan of care is being sent to you because your patient’s medical reimbursement requires that you certify the plan of care. Your signature is required to allow uninterrupted insurance coverage.      You may indicate your approval by signing below and faxing this form back to us at Dept Fax: [phone].    Please call Dept: [phone] with any questions or concerns.    Thank you for this referral,        Shakila Titus, PT  [facility]  [address] [facility] [address]  [phone]    Payer: Payor: LUISA SHEA [facility] / Plan: [employer] / Product Type: *No Product type* /                                                                         Date:     Dear Shakila Titus PT,     Re: Mr. Sergio Mead, MRN:[id number]    I certify that I have reviewed the attached plan of care and it is medically necessary for Mr. Sergio Mead (1955) who is under my care.          ______________________________________                    _________________  Provider name and credentials                                           Date and time                                                                                           Plan of Care 1/18/24   Effective from: 1/18/2024  Effective to: 4/17/2024    Plan ID: [id number]            Participants as of Finalize on 1/18/2024    Name Type Comments Contact Info    Mary Reaves MD Referring Provider  [phone]    Shakila Titus, PT Physical Therapist  [phone]       Last Plan Note     Author: Shakila Titus PT Status: Incomplete Last edited: 1/18/2024 11:15 AM       Physical Therapy Treatment    Patient  Name:Sergio Mead  MRN:[id number]  Today's Date:2024  Referred by: Mary Reaves  Time Calculation  Start Time: 1105  Stop Time: 1220  Time Calculation (min): 75 min    Therapy Diagnosis  1. Traumatic closed nondisplaced fracture of shaft of fibula, left, with routine healing, subsequent encounter    2. Antalgic gait         Assessment: Patient is gradually progressing toward goals.  Able to progress exercises today.      Plan: Awaiting approval from insurance for additional visits - patient encouraged to improve compliance with HEP    Insurance  Visit number: 15  Onset Date: 2/10/2023  Medicare Certification Period:  Beginnin/6/2023            Endin24  Medicare Re-certification period: Beginnin24       Endin24    Precautions/Fall Risk: weight bearing status: as tolerated, fall risk low        Subjective/Pain: Current Pain Level (0-10):  Average left ankle pain is 5-6/10 over the past week.  He is using it more so he is feeling more.  When he is wearing his custom inserts in his shoes he definitely feels better.  He wants to look into custom inserts for his slippers at home.  He is going to call MD about that today.  He is pleased that he is able to walk without crutches all of the time.  He uses a single crutch for extended distances.  He feels like his ankle rolls some due to his weight.    [facility] Compliance: Fair    Objective/Outcome Measures:  Initial 23 //23 /  L ankle PF AROM (degrees): 10  50 // 45 // 48  L ankle DF AROM (degrees): neutral // 5 //10 // 10  L ankle INV AROM (degrees): 10 / 20 //  10 / 20  L ankle EV AROM (degrees): 5 // 10 // 5 // 10  L ankle PF strength (MMT): 4-/5 // 4/5 //  4-/5 // 4+/5  L ankle DF strength (MMT): 4+/5 // 5/5 // 5/5 // 4/5  L ankle INV strength (MMT): 3/5 // 4-/5 //  4-/5 // 4/5  L ankle EV strength (MMT): 4-/5 // 4+/5 // 4-/ //   Palpation: tender to palpation lateral malleoli and top of foot // some tenderness  to palpation lateral malleoli and foot//mild tenderness to palpation medial and lateral malleoli and arch of foot, lateral calf/Achilles //  minor irritation lateral malleoli   Gait: boot left foot, crutches // patient able to ambulate short distances with shoe on both feet//uses boot and crutch about 1-2 hours a day, at home boot and tennis shoe // ambulates with antalgic gait with tennis shoe //  patient able to ambulate with tennis shoes on both feet, min antalgic gait, uses 1 crutch for extended distances  LEFS: 7/80 // 20/80 // 24/80 // 31/80    Treatment  Therapeutic Exercise 45 minutes  Upright bike level 1 x7 minutes  Slant board calf stretch x1 minute  Step left hamstring stretch x1 minute  4 inch step down with right foot / left foot stays on step x20 reps  4 inch lateral step down with left foot on step x20 reps  FWD mini lunge onto BOSU x20 reps bilateral   Square rocker board rock fwd/back x25 reps  Standing BAPS level 3 PF/DF x20 reps, INV/EV x20 reps  Blue foam march x30 sec with min UE assist     Modalities   Vasopneumatic Device     10 minutes    Goals: Goals set and discussed today. By discharge:  Balance: Patient will be able to balance on left LE x10 sec with min UE assist. - met  Gait/Locomotion: Patient will ambulate with min gait deviations without use of AD.,- met  Pain: Patient will report a high pain level less than or equal to 3/10 with ADLs.,- progressing  Strength: Patient will increase left ankle strength to greater than or equal to 4/5 PF, INV, and EV - progressing  Patient will score greater than or equal to 40/80 on LEFS to demonstrate improved function - progressing  Patient will properly demonstrate HEP to improve LE ROM, strength, and function. - met     Planned interventions include: prn. Cryotherapy/moist hot pack prn, edema control, electrical stimulation prn, gait training, home exercise program, Kinesiotaping PRN, manual therapy, therapeutic exercise, vasopneumatic device  with cold     Frequency and duration: 1x/week     Potential to achieve rehab goals is good       Plan of care was developed with input and agreement by the patient.   Patient's Goal for Treatment:  relieving pain , increasing strength , increasing mobility, reducing symptoms.          Current Participants as of 1/18/2024    Name Type Comments Contact Info    Mary Reaves MD Referring Provider  [phone]    Signature pending    Shakila Titus PT Physical Therapist  [phone]    Signature pending

## 2024-01-18 NOTE — PROGRESS NOTES
Physical Therapy Treatment    Patient Name:Sergio Mead  MRN:[id number]  Today's Date:2024  Referred by: Mary Reaves  Time Calculation  Start Time: 1105  Stop Time: 1220  Time Calculation (min): 75 min    Therapy Diagnosis  1. Traumatic closed nondisplaced fracture of shaft of fibula, left, with routine healing, subsequent encounter    2. Antalgic gait         Assessment: Patient is gradually progressing toward goals.  Able to progress exercises today.      Plan: Awaiting approval from insurance for additional visits - patient encouraged to improve compliance with HEP    Insurance  Visit number: [phone] (15)  Onset Date: 2/10/2023  Medicare Certification Period:  Beginnin/6/2023            Endin24  Medicare Re-certification period: Beginnin24       Endin24    Precautions/Fall Risk: weight bearing status: as tolerated, fall risk low        Subjective/Pain: Current Pain Level (0-10):  Average left ankle pain is 5-6/10 over the past week.  He is using it more so he is feeling more.  When he is wearing his custom inserts in his shoes he definitely feels better.  He wants to look into custom inserts for his slippers at home.  He is going to call MD about that today.  He is pleased that he is able to walk without crutches all of the time.  He uses a single crutch for extended distances.  He feels like his ankle rolls some due to his weight.    [facility] Compliance: Fair    Objective/Outcome Measures:  Initial /23 //23 /  L ankle PF AROM (degrees): 10  50 // 45 // 48  L ankle DF AROM (degrees): neutral // 5 //10 // 10  L ankle INV AROM (degrees): 10  20 //  10 // 20  L ankle EV AROM (degrees): 5 // 10 // 5 // 10  L ankle PF strength (MMT): 4-/5 // 4/5 //  4-/5 // 4+/5  L ankle DF strength (MMT): 4+/5 // 5/5 // 5/5 // 4/5  L ankle INV strength (MMT): 3/5 // 4-/5 //  4-/5 // 4/5  L ankle EV strength (MMT): 4-/5 // 4+/5 // 4-/5 // /5  Palpation: tender to palpation lateral  malleoli and top of foot // some tenderness to palpation lateral malleoli and foot//mild tenderness to palpation medial and lateral malleoli and arch of foot, lateral calf/Achilles //  minor irritation lateral malleoli   Gait: boot left foot, crutches // patient able to ambulate short distances with shoe on both feet//uses boot and crutch about 1-2 hours a day, at home boot and tennis shoe // ambulates with antalgic gait with tennis shoe //  patient able to ambulate with tennis shoes on both feet, min antalgic gait, uses 1 crutch for extended distances  LEFS: 7/80 // 20/80 // 24/80 // 31/80    Treatment  Therapeutic Exercise 45 minutes  Upright bike level 1 x7 minutes  Slant board calf stretch x1 minute  Step left hamstring stretch x1 minute  4 inch step down with right foot / left foot stays on step x20 reps  4 inch lateral step down with left foot on step x20 reps  FWD mini lunge onto BOSU x20 reps bilateral   Square rocker board rock fwd/back x25 reps  Standing BAPS level 3 PF/DF x20 reps, INV/EV x20 reps  Blue foam march x30 sec with min UE assist     Modalities   Vasopneumatic Device     10 minutes    Goals: Goals set and discussed today. By discharge:  Balance: Patient will be able to balance on left LE x10 sec with min UE assist. - met  Gait/Locomotion: Patient will ambulate with min gait deviations without use of AD.,- met  Pain: Patient will report a high pain level less than or equal to 3/10 with ADLs.,- progressing  Strength: Patient will increase left ankle strength to greater than or equal to 4/5 PF, INV, and EV - progressing  Patient will score greater than or equal to 40/80 on LEFS to demonstrate improved function - progressing  Patient will properly demonstrate HEP to improve LE ROM, strength, and function. - met     Planned interventions include: prn. Cryotherapy/moist hot pack prn, edema control, electrical stimulation prn, gait training, home exercise program, Kinesiotaping PRN, manual therapy,  therapeutic exercise, vasopneumatic device with cold     Frequency and duration: 1x/week     Potential to achieve rehab goals is good       Plan of care was developed with input and agreement by the patient.   Patient's Goal for Treatment:  relieving pain , increasing strength , increasing mobility, reducing symptoms.

## 2024-01-25 ENCOUNTER — DOCUMENTATION (OUTPATIENT)
Dept: PHYSICAL THERAPY | Facility: HOSPITAL | Age: 69
End: 2024-01-25
Payer: COMMERCIAL

## 2024-01-25 NOTE — PROGRESS NOTES
Physical Therapy                 Therapy Communication Note    Patient Name: Sergio Mead  MRN: [id number]  Today's Date: 1/25/2024     Discipline: Physical Therapy    Missed Visit Reason: Patient came to appointment and reported that he is not feeling well - congestion and some shortness of breath.  I advised patient to cancel today's appointment and follow up with PCP or ER as needed.    Missed Time: Cancel    Comment:

## 2024-01-29 ENCOUNTER — APPOINTMENT (OUTPATIENT)
Dept: PHYSICAL THERAPY | Facility: HOSPITAL | Age: 69
End: 2024-01-29
Payer: COMMERCIAL

## 2024-02-20 ENCOUNTER — APPOINTMENT (OUTPATIENT)
Dept: PHYSICAL THERAPY | Facility: HOSPITAL | Age: 69
End: 2024-02-20
Payer: COMMERCIAL

## 2024-04-12 ENCOUNTER — OFFICE VISIT (OUTPATIENT)
Dept: ORTHOPEDIC SURGERY | Facility: CLINIC | Age: 69
End: 2024-04-12
Payer: COMMERCIAL

## 2024-04-12 ENCOUNTER — HOSPITAL ENCOUNTER (OUTPATIENT)
Dept: RADIOLOGY | Facility: CLINIC | Age: 69
Discharge: HOME | End: 2024-04-12
Payer: COMMERCIAL

## 2024-04-12 DIAGNOSIS — S82.402D TRAUMATIC CLOSED NONDISPLACED FRACTURE OF SHAFT OF FIBULA, LEFT, WITH ROUTINE HEALING, SUBSEQUENT ENCOUNTER: ICD-10-CM

## 2024-04-12 DIAGNOSIS — G57.92 NEURITIS OF LEFT LOWER EXTREMITY: Primary | ICD-10-CM

## 2024-04-12 PROCEDURE — 4010F ACE/ARB THERAPY RXD/TAKEN: CPT | Performed by: ORTHOPAEDIC SURGERY

## 2024-04-12 PROCEDURE — 73610 X-RAY EXAM OF ANKLE: CPT | Mod: LEFT SIDE | Performed by: RADIOLOGY

## 2024-04-12 PROCEDURE — 99213 OFFICE O/P EST LOW 20 MIN: CPT | Performed by: ORTHOPAEDIC SURGERY

## 2024-04-12 PROCEDURE — 73610 X-RAY EXAM OF ANKLE: CPT | Mod: LT

## 2024-04-13 NOTE — PROGRESS NOTES
This is a pleasant 68 y.o. year old male who presents for fuv of the left lower leg soreness, dysesthesias, arch soreness.  He completed PT a few weeks ago, occasionally doing his HEP.  Notes soreness/sensitivity dyesthesias of proximal lateral calf extending down the leg towards the ankle; no foot drop.  Also states that he feels that his foot and ankle rolling over- patient showed us what he meant and pronation noted.  He also feels like a band is around his ankle and hindfoot area.  He is using diabetic shoes and inserts.  He has limited walking tolerance.  Saw podiatrist and has an ankle lace-up brace that he is not currently using.  No recent trauma.  He has a trip to [place] in 7 days and wants to do all activities with no restrictions.      PHYSICAL EXAMINATION  Constitutional Exam: patient's height and weight reviewed, well-kempt  Psychiatric Exam: alert and oriented x 3, appropriate mood and behavior  Eye Exam: RUBI, EOMI  Pulmonary Exam: breathing non-labored, no apparent distress  Lymphatic exam: no appreciable lymphadenopathy in the lower extremities  Cardiovascular exam: DP pulses 2+ bilaterally, PT 2+ bilaterally, toes are pink with good capillary refill, no pitting edema  Skin exam: no open lesions, rashes, abrasions or ulcerations  Neurological exam: left lower leg hypesthesia to light touch over the proximal lateral aspect over lateral compartment area extending down to ankle, motor intact for DF/PF/INV/EV, neuropathy foot  Musculoskeletal exam: left foot and ankle: no ankle effusion, no pain to palpation over distal fibula where fracture occurred, mild soreness over medial arch/PTT at times, increased hindfoot valgus and adult onset flatfoot deformity grade 2A, no cavus and no cavovarus, stable ligamentous exam, calf tone and girth improved    DATA/RESULTS REVIEW: I personally reviewed the patient's x-ray images and reports of the  left ankle shows good healing of the distal fibular fracture with  consolidated callus at periphery of all cortices, medial mortise intact, tibiotalar joint parallel to ground (normal alignment of ankle), midfoot sag .     ASSESSMENT: left leg concern for possible lumbar radicular pain down lateral lower leg versus diabetic related mononeuropathy versus peripheral neuropathy.  He also has posterior tibial tendon dysfunction causing adult onset flatfoot deformity and arch pain related to posterior tibial tendonitis.  Discussed with him that he has tendon degeneration of posterior tibial tendon that takes time to occur and can be related to DM, elevated BMI, poor supportive shoewear and hereditary factors.  Ankle injury recently likely exacerbated the underlying posterior tibial tendon issue and is common.    PLAN: Further treatment options discussed including use of ankle brace plus his orthotics.  Discussed option of AFO-orthotic but he wanted to hold off at this time.  Discussed with him options of evaluation for the dyesthesias in left lower leg and foot- EMG and NCV ordered.  Encouraged him to do continue doing his HEP.  Discussed options to plan activities for upcoming trip to avoid overuse.  His calf girth and motion of the ankle has improved; still has to work on endurance.   Stationary bike or elliptical training discussed and working up to walking program.  FUV post his trip (will be gone for 4 weeks).  He does not appear to have pain over his distal fibula, but will monitor.   The patient's questions were answered in detail.      Note dictated with [employer] software, completed without full type editing to avoid delay.

## 2024-04-15 DIAGNOSIS — S82.402D TRAUMATIC CLOSED NONDISPLACED FRACTURE OF SHAFT OF FIBULA, LEFT, WITH ROUTINE HEALING, SUBSEQUENT ENCOUNTER: ICD-10-CM

## 2024-04-15 DIAGNOSIS — G57.92 NEURITIS OF LEFT LOWER EXTREMITY: ICD-10-CM

## 2024-05-13 ENCOUNTER — DOCUMENTATION (OUTPATIENT)
Dept: PHYSICAL THERAPY | Facility: HOSPITAL | Age: 69
End: 2024-05-13
Payer: COMMERCIAL

## 2024-05-13 NOTE — PROGRESS NOTES
Physical Therapy    Discharge Summary    Name: Sergio Mead  MRN: [id number]  : 1955  Date: 24    Discharge Summary: PT    Discharge Information: Date of discharge 24, Date of last visit 24, Date of evaluation 8/10/23, Number of attended visits 15, Referred by Jelly, and Referred for left ankle.    Therapy Summary: Patient was a 68 y/o male who presented s/p left ankle nondisplaced distal fibular Sanchez B fracture and deltoid sprain.  Standardized testing and measures revealed that the patient had multiple impairments in body structures and functions, activity limitations, and participation restrictions.  These included subjective and objective findings such as pain, tenderness to palpation of the effected area, decreased ROM, strength, flexibility and function.  The patients impairments were consistent with injury and immobility and likely influenced by mechanical dysfunction and deconditioning.      Discharge Status: Patient demonstrated improved ROM, strength, and function. He was instructed in a HEP.    Rehab Discharge Reason: Achieved all and/or the most significant goals(s) and Attendance inconsistent

## 2024-06-17 ENCOUNTER — OFFICE VISIT (OUTPATIENT)
Dept: ORTHOPEDIC SURGERY | Facility: HOSPITAL | Age: 69
End: 2024-06-17
Payer: COMMERCIAL

## 2024-06-17 DIAGNOSIS — S82.402D TRAUMATIC CLOSED NONDISPLACED FRACTURE OF SHAFT OF FIBULA, LEFT, WITH ROUTINE HEALING, SUBSEQUENT ENCOUNTER: ICD-10-CM

## 2024-06-17 DIAGNOSIS — E11.40 TYPE 2 DIABETES, CONTROLLED, WITH NEUROPATHY (MULTI): Primary | ICD-10-CM

## 2024-06-17 PROCEDURE — 4010F ACE/ARB THERAPY RXD/TAKEN: CPT | Performed by: ORTHOPAEDIC SURGERY

## 2024-06-17 PROCEDURE — 99212 OFFICE O/P EST SF 10 MIN: CPT | Performed by: ORTHOPAEDIC SURGERY

## 2024-06-17 PROCEDURE — 1159F MED LIST DOCD IN RCRD: CPT | Performed by: ORTHOPAEDIC SURGERY

## 2024-06-17 NOTE — PROGRESS NOTES
This is a pleasant 68 y.o. year old male who presents for fuv of left ankle.  He did well on his trip to [place] and is using his diabetic shoes and inserts.  He occasionally has noted that his arch is falling or ankle tends to roll inwards.  Pain in the ankle has resolved most of the soreness is below the ankle at the subtalar joint level.  Only painful if he does a lot of walking or standing.  Interventions: He does mainly drawing the alphabet with his big toe as the only exercise    PHYSICAL EXAMINATION  Constitutional Exam: patient's height and weight reviewed, well-kempt  Psychiatric Exam: alert and oriented x 3, appropriate mood and behavior  Eye Exam: RUBI, EOMI  Pulmonary Exam: breathing non-labored, no apparent distress  Lymphatic exam: no appreciable lymphadenopathy in the lower extremities  Cardiovascular exam: DP pulses 2+ bilaterally, PT 2+ bilaterally, toes are pink with good capillary refill, no pitting edema  Skin exam: no open lesions, rashes, abrasions or ulcerations  Neurological exam: sensation to light touch intact in both lower extremities in peripheral and dermatomal distributions (except for any abnormalities noted in musculoskeletal exam)    Musculoskeletal exam: Left foot and ankle: No pain along the distal fibula or fibular shaft on palpation, soreness over sinus Tarsi and a little bit over the proximal muscle belly of the peroneals, stable ligamentous exam, motor strength intact, adult onset flatfoot deformity with some sinus Tarsi impingement     ASSESSMENT: Healed left ankle distal fibular fracture, adult onset flatfoot deformity with posterior tibial tendon dysfunction currently controlled using diabetic shoes and orthotics  PLAN: Further treatment options discussed including strengthening to his ankle exercises and showed him particularly to work on posterior tibial and peroneal muscle strengthening.  The alphabet exercises with his great toe for range of motion is too easy for him at  this time.  Recommended he do the other exercises that his therapist gave him for strengthening.  Otherwise he is doing very well and will follow-up with us on as-needed basis or if he encounters any questions or concerns.  The patient's questions were answered in detail.      Note dictated with [employer] software, completed without full type editing to avoid delay.